# Patient Record
Sex: FEMALE | Race: AMERICAN INDIAN OR ALASKA NATIVE
[De-identification: names, ages, dates, MRNs, and addresses within clinical notes are randomized per-mention and may not be internally consistent; named-entity substitution may affect disease eponyms.]

---

## 2017-12-04 ENCOUNTER — HOSPITAL ENCOUNTER (OUTPATIENT)
Dept: HOSPITAL 5 - TRG | Age: 22
Discharge: HOME | End: 2017-12-04
Attending: OBSTETRICS & GYNECOLOGY
Payer: COMMERCIAL

## 2017-12-04 VITALS — SYSTOLIC BLOOD PRESSURE: 128 MMHG | DIASTOLIC BLOOD PRESSURE: 81 MMHG

## 2017-12-04 DIAGNOSIS — O47.03: Primary | ICD-10-CM

## 2017-12-04 DIAGNOSIS — Z3A.36: ICD-10-CM

## 2017-12-04 LAB
BACTERIA #/AREA URNS HPF: (no result) /HPF
BILIRUB UR QL STRIP: (no result)
BLOOD UR QL VISUAL: (no result)
KETONES UR STRIP-MCNC: (no result) MG/DL
LEUKOCYTE ESTERASE UR QL STRIP: (no result)
MUCOUS THREADS #/AREA URNS HPF: (no result) /HPF
NITRITE UR QL STRIP: (no result)
PH UR STRIP: 7 [PH] (ref 5–7)
PROT UR STRIP-MCNC: (no result) MG/DL
RBC #/AREA URNS HPF: 4 /HPF (ref 0–6)
UROBILINOGEN UR-MCNC: 2 MG/DL (ref ?–2)
WBC #/AREA URNS HPF: 7 /HPF (ref 0–6)

## 2017-12-04 PROCEDURE — 81001 URINALYSIS AUTO W/SCOPE: CPT

## 2017-12-04 PROCEDURE — 86850 RBC ANTIBODY SCREEN: CPT

## 2017-12-04 PROCEDURE — 86900 BLOOD TYPING SEROLOGIC ABO: CPT

## 2017-12-04 PROCEDURE — 86901 BLOOD TYPING SEROLOGIC RH(D): CPT

## 2017-12-23 ENCOUNTER — HOSPITAL ENCOUNTER (OUTPATIENT)
Dept: HOSPITAL 5 - TRG | Age: 22
Discharge: HOME | End: 2017-12-23
Attending: OBSTETRICS & GYNECOLOGY
Payer: COMMERCIAL

## 2017-12-23 VITALS — DIASTOLIC BLOOD PRESSURE: 77 MMHG | SYSTOLIC BLOOD PRESSURE: 123 MMHG

## 2017-12-23 DIAGNOSIS — O47.1: Primary | ICD-10-CM

## 2017-12-23 DIAGNOSIS — Z3A.39: ICD-10-CM

## 2017-12-23 LAB
ALT SERPL-CCNC: 11 UNITS/L (ref 7–56)
BILIRUB UR QL STRIP: (no result)
BLOOD UR QL VISUAL: (no result)
HCT VFR BLD CALC: 32.3 % (ref 30.3–42.9)
HGB BLD-MCNC: 10.3 GM/DL (ref 10.1–14.3)
KETONES UR STRIP-MCNC: (no result) MG/DL
LEUKOCYTE ESTERASE UR QL STRIP: (no result)
MCH RBC QN AUTO: 24 PG (ref 28–32)
MCHC RBC AUTO-ENTMCNC: 32 % (ref 30–34)
MCV RBC AUTO: 75 FL (ref 79–97)
MUCOUS THREADS #/AREA URNS HPF: (no result) /HPF
NITRITE UR QL STRIP: (no result)
PH UR STRIP: 7 [PH] (ref 5–7)
PLATELET # BLD: 212 K/MM3 (ref 140–440)
PROT UR STRIP-MCNC: (no result) MG/DL
RBC # BLD AUTO: 4.32 M/MM3 (ref 3.65–5.03)
RBC #/AREA URNS HPF: 3 /HPF (ref 0–6)
URATE SERPL-MCNC: 3.7 MG/DL (ref 3.5–7.6)
UROBILINOGEN UR-MCNC: 4 MG/DL (ref ?–2)
WBC # BLD AUTO: 7.6 K/MM3 (ref 4.5–11)
WBC #/AREA URNS HPF: 2 /HPF (ref 0–6)

## 2017-12-23 PROCEDURE — 85027 COMPLETE CBC AUTOMATED: CPT

## 2017-12-23 PROCEDURE — 83615 LACTATE (LD) (LDH) ENZYME: CPT

## 2017-12-23 PROCEDURE — 59025 FETAL NON-STRESS TEST: CPT

## 2017-12-23 PROCEDURE — 84460 ALANINE AMINO (ALT) (SGPT): CPT

## 2017-12-23 PROCEDURE — 81001 URINALYSIS AUTO W/SCOPE: CPT

## 2017-12-23 PROCEDURE — 82565 ASSAY OF CREATININE: CPT

## 2017-12-23 PROCEDURE — 84450 TRANSFERASE (AST) (SGOT): CPT

## 2017-12-23 PROCEDURE — 36415 COLL VENOUS BLD VENIPUNCTURE: CPT

## 2017-12-23 PROCEDURE — 84550 ASSAY OF BLOOD/URIC ACID: CPT

## 2017-12-24 ENCOUNTER — HOSPITAL ENCOUNTER (INPATIENT)
Dept: HOSPITAL 5 - TRG | Age: 22
LOS: 4 days | Discharge: HOME | End: 2017-12-28
Attending: OBSTETRICS & GYNECOLOGY | Admitting: OBSTETRICS & GYNECOLOGY
Payer: SELF-PAY

## 2017-12-24 DIAGNOSIS — F19.10: ICD-10-CM

## 2017-12-24 DIAGNOSIS — Z3A.38: ICD-10-CM

## 2017-12-24 LAB
BENZODIAZEPINES SCREEN,URINE: (no result)
BILIRUB UR QL STRIP: (no result)
BLOOD UR QL VISUAL: (no result)
HCT VFR BLD CALC: 33.6 % (ref 30.3–42.9)
HGB BLD-MCNC: 10.7 GM/DL (ref 10.1–14.3)
MCH RBC QN AUTO: 24 PG (ref 28–32)
MCHC RBC AUTO-ENTMCNC: 32 % (ref 30–34)
MCV RBC AUTO: 76 FL (ref 79–97)
METHADONE SCREEN,URINE: (no result)
MUCOUS THREADS #/AREA URNS HPF: (no result) /HPF
NITRITE UR QL STRIP: (no result)
OPIATE SCREEN,URINE: (no result)
PH UR STRIP: 7 [PH] (ref 5–7)
PLATELET # BLD: 198 K/MM3 (ref 140–440)
RBC # BLD AUTO: 4.4 M/MM3 (ref 3.65–5.03)
RBC #/AREA URNS HPF: 2 /HPF (ref 0–6)
UROBILINOGEN UR-MCNC: 4 MG/DL (ref ?–2)
WBC #/AREA URNS HPF: 1 /HPF (ref 0–6)

## 2017-12-24 PROCEDURE — 99211 OFF/OP EST MAY X REQ PHY/QHP: CPT

## 2017-12-24 PROCEDURE — 80307 DRUG TEST PRSMV CHEM ANLYZR: CPT

## 2017-12-24 PROCEDURE — 86762 RUBELLA ANTIBODY: CPT

## 2017-12-24 PROCEDURE — 84460 ALANINE AMINO (ALT) (SGPT): CPT

## 2017-12-24 PROCEDURE — 85014 HEMATOCRIT: CPT

## 2017-12-24 PROCEDURE — 83615 LACTATE (LD) (LDH) ENZYME: CPT

## 2017-12-24 PROCEDURE — 86803 HEPATITIS C AB TEST: CPT

## 2017-12-24 PROCEDURE — 85027 COMPLETE CBC AUTOMATED: CPT

## 2017-12-24 PROCEDURE — 84450 TRANSFERASE (AST) (SGOT): CPT

## 2017-12-24 PROCEDURE — 83735 ASSAY OF MAGNESIUM: CPT

## 2017-12-24 PROCEDURE — 90686 IIV4 VACC NO PRSV 0.5 ML IM: CPT

## 2017-12-24 PROCEDURE — 86706 HEP B SURFACE ANTIBODY: CPT

## 2017-12-24 PROCEDURE — 85018 HEMOGLOBIN: CPT

## 2017-12-24 PROCEDURE — 85660 RBC SICKLE CELL TEST: CPT

## 2017-12-24 PROCEDURE — 36415 COLL VENOUS BLD VENIPUNCTURE: CPT

## 2017-12-24 PROCEDURE — 86901 BLOOD TYPING SEROLOGIC RH(D): CPT

## 2017-12-24 PROCEDURE — 82565 ASSAY OF CREATININE: CPT

## 2017-12-24 PROCEDURE — 86592 SYPHILIS TEST NON-TREP QUAL: CPT

## 2017-12-24 PROCEDURE — 86850 RBC ANTIBODY SCREEN: CPT

## 2017-12-24 PROCEDURE — 86900 BLOOD TYPING SEROLOGIC ABO: CPT

## 2017-12-24 PROCEDURE — A6250 SKIN SEAL PROTECT MOISTURIZR: HCPCS

## 2017-12-24 PROCEDURE — G0463 HOSPITAL OUTPT CLINIC VISIT: HCPCS

## 2017-12-24 PROCEDURE — 81001 URINALYSIS AUTO W/SCOPE: CPT

## 2017-12-24 PROCEDURE — 87806 HIV AG W/HIV1&2 ANTB W/OPTIC: CPT

## 2017-12-24 PROCEDURE — 84550 ASSAY OF BLOOD/URIC ACID: CPT

## 2017-12-24 RX ADMIN — FENTANYL CITRATE SCH MLS/HR: 50 INJECTION, SOLUTION INTRAMUSCULAR; INTRAVENOUS at 17:36

## 2017-12-24 RX ADMIN — AMPICILLIN SODIUM SCH MLS/HR: 1 INJECTION, POWDER, FOR SOLUTION INTRAMUSCULAR; INTRAVENOUS at 18:52

## 2017-12-24 RX ADMIN — SODIUM CHLORIDE, SODIUM LACTATE, POTASSIUM CHLORIDE, AND CALCIUM CHLORIDE SCH MLS/HR: .6; .31; .03; .02 INJECTION, SOLUTION INTRAVENOUS at 14:21

## 2017-12-24 RX ADMIN — SODIUM CHLORIDE, SODIUM LACTATE, POTASSIUM CHLORIDE, AND CALCIUM CHLORIDE SCH MLS/HR: .6; .31; .03; .02 INJECTION, SOLUTION INTRAVENOUS at 12:59

## 2017-12-24 RX ADMIN — FENTANYL CITRATE SCH MLS/HR: 50 INJECTION, SOLUTION INTRAMUSCULAR; INTRAVENOUS at 16:11

## 2017-12-24 RX ADMIN — BUTORPHANOL TARTRATE PRN MG: 2 INJECTION, SOLUTION INTRAMUSCULAR; INTRAVENOUS at 13:52

## 2017-12-24 RX ADMIN — AMPICILLIN SODIUM SCH MLS/HR: 1 INJECTION, POWDER, FOR SOLUTION INTRAMUSCULAR; INTRAVENOUS at 18:00

## 2017-12-24 RX ADMIN — SODIUM CHLORIDE, SODIUM LACTATE, POTASSIUM CHLORIDE, AND CALCIUM CHLORIDE SCH MLS/HR: .6; .31; .03; .02 INJECTION, SOLUTION INTRAVENOUS at 15:38

## 2017-12-24 NOTE — HISTORY AND PHYSICAL REPORT
History of Present Illness


Date of examination: 17


Date of admission: 


17 12:25





History of present illness: 


22-year-old black female para 0 who is at estimated gestational age of 38 weeks 

by 15 week ultrasound presents to labor and delivery with complaints of regular 

contractions patient on observation had a change in the cervix in triage being 

admitted for active labor.  Patient's prenatal course is been complicated by 

lack of a prenatal care the patient has had several visits to the hospital with 

says she could not get insurance because of her lack of [place] citizenship








Past History


Past Medical History: no pertinent history


Past Surgical History: no surgical history


Social history: single





- Obstetrical History


Expected Date of Delivery: 18


Actual Gestation: 38 Week(s) 6 Day(s) 


: 1


Para: 0


Hx # Term Pregnancies: 0


Number of  Pregnancies: 0


Spontaneous Abortions: 0


Induced : 0





Medications and Allergies


 Allergies











Allergy/AdvReac Type Severity Reaction Status Date / Time


 


No Known Allergies Allergy   Verified 17 10:52











 Home Medications











 Medication  Instructions  Recorded  Confirmed  Last Taken  Type


 


Prenatal Vit Calc,Iron,Folic 1 each PO DAILY 17 09:00 

History





[Prenatal Vitamins]     











Active Meds: 


Active Medications





Butorphanol Tartrate (Stadol)  2 mg IV Q2H PRN


   PRN Reason: Pain , Severe (7-10)


   Last Admin: 17 13:52 Dose:  2 mg


Ephedrine Sulfate (Ephedrine Sulfate)  10 mg IV Q2M PRN


   PRN Reason: Hypotension


Lactated Ringer's (Lactated Ringers)  1,000 mls @ 125 mls/hr IV AS DIRECT JUAQUIN


   Last Admin: 17 14:21 Dose:  125 mls/hr


Oxytocin/Sodium Chloride (Pitocin/Ns 20 Unit/1000ml Drip)  20 units in 1,000 

mls @ 125 mls/hr IV AS DIRECT JUAQUIN


Ampicillin Sodium (Polycillin/Ns 1 Gm/50 Ml)  1 gm in 50 mls @ 100 mls/hr IV 

Q4HR JUAQUIN


   PRN Reason: Protocol


Promethazine HCl (Phenergan)  25 mg PO Q6H PRN


   PRN Reason: Nausea And Vomiting











- Vital Signs


Vital signs: 


 Vital Signs











Temp Pulse Resp BP Pulse Ox


 


 97.4 F L  78   20   129/75   98 


 


 17 07:54  17 07:54  17 07:54  17 07:54  17 07:54








 











Temp Pulse Resp BP Pulse Ox


 


 97.6 F   79   20   128/76   96 


 


 17 12:55  17 13:08  17 13:52  17 12:57  17 13:08














- Physical Exam


Breasts: Positive: deferred


Cardiovascular: Regular rate


Lungs: Positive: Normal air movement


Genitourinary (Female): Positive: normal external genitalia


Vagina: Positive: normal moisture


Cervix: Positive: other


Uterus: Positive: enlarged


Anus/Rectum: Positive: normal perianal skin





- Obstetrical


FHR: category 1


Cervical Dilatation: 5


Cervical Effacement Percentage: 80


Fetal station: -1


Uterine Contraction Pattern: Regular





Results


Result Diagrams: 


 17 13:15





 Abnormal lab results











  17 Range/Units





  13:15 


 


MCV  76 L  (79-97)  fl


 


MCH  24 L  (28-32)  pg








All other labs normal.








Assessment and Plan





- Patient Problems


(1) Insufficient prenatal care in third trimester


Current Visit: Yes   Status: Acute   


Plan to address problem: 


Patient adjustments even cares comes in triage here labor and delivery states 

that she can get insurance because is not American citizen








(2) Drug abuse during pregnancy


Current Visit: Yes   Status: Acute   


Plan to address problem: 


And taken history patient denies any drug use but has a positive urine drug 

screen.  We'll have  consult after delivery








(3) Active labor at term


Current Visit: Yes   Status: Acute   


Plan to address problem: 


Admitted labor and deliver follow labor protocol will give antibiotic 

prophylaxis

## 2017-12-24 NOTE — EVENT NOTE
Date: 12/24/17





AROM clear fluid unable to place internal monitors due patient's complaints 

about vaginal exam

## 2017-12-24 NOTE — ANESTHESIA CONSULTATION
Anesthesia Consult and Med Hx


Date of service: 12/24/17





- Airway


Anesthetic Teeth Evaluation: Good


ROM Head & Neck: Adequate


Mental/Hyoid Distance: Adequate


Intubation Access Assessment: Probably Good





- Pre-Operative Health Status


ASA Pre-Surgery Classification: ASA2, Emergency


Proposed Anesthetic Plan: Epidural, Spinal





- Pulmonary


Hx Asthma: No


COPD: No


Hx Pneumonia: No





- Cardiovascular System


Hx Hypertension: No





- Central Nervous System


Hx Seizures: No


Hx Psychiatric Problems: No





- Endocrine


Hx Renal Disease: No


Hx End Stage Renal Disease: No


Hx Hypothyroidism: No


Hx Hyperthyroidism: No





- Hematic


Hx Anemia: No


Hx Sickle Cell Disease: No





- Other Systems


Hx Alcohol Use: No

## 2017-12-25 LAB
HCT VFR BLD CALC: 31.3 % (ref 30.3–42.9)
HGB BLD-MCNC: 9.6 GM/DL (ref 10.1–14.3)

## 2017-12-25 PROCEDURE — 0KQM0ZZ REPAIR PERINEUM MUSCLE, OPEN APPROACH: ICD-10-PCS | Performed by: OBSTETRICS & GYNECOLOGY

## 2017-12-25 PROCEDURE — 10907ZC DRAINAGE OF AMNIOTIC FLUID, THERAPEUTIC FROM PRODUCTS OF CONCEPTION, VIA NATURAL OR ARTIFICIAL OPENING: ICD-10-PCS | Performed by: OBSTETRICS & GYNECOLOGY

## 2017-12-25 PROCEDURE — 3E0R3BZ INTRODUCTION OF ANESTHETIC AGENT INTO SPINAL CANAL, PERCUTANEOUS APPROACH: ICD-10-PCS | Performed by: OBSTETRICS & GYNECOLOGY

## 2017-12-25 PROCEDURE — 00HU33Z INSERTION OF INFUSION DEVICE INTO SPINAL CANAL, PERCUTANEOUS APPROACH: ICD-10-PCS | Performed by: OBSTETRICS & GYNECOLOGY

## 2017-12-25 RX ADMIN — IBUPROFEN SCH: 600 TABLET, FILM COATED ORAL at 02:25

## 2017-12-25 RX ADMIN — Medication PRN APPLIC: at 07:09

## 2017-12-25 RX ADMIN — BUTORPHANOL TARTRATE PRN MG: 2 INJECTION, SOLUTION INTRAMUSCULAR; INTRAVENOUS at 01:26

## 2017-12-25 RX ADMIN — IBUPROFEN SCH MG: 600 TABLET, FILM COATED ORAL at 06:27

## 2017-12-25 NOTE — PROCEDURE NOTE
OB Delivery Note





- Delivery


Date of Delivery: 17


Surgeon: ANTHONY DOLAN


Estimated blood loss: 300cc





- Vaginal


Intrapartum events: no prenatal care


Delivery augmentation: rupture of membranes, pitocin


Delivery monitor: external FHT, external uterine, internal FHT, internal uterine


Route of delivery: 


Delivery placenta: spontaneous


Delivery laceration: 2nd degree


Delivery repair: vicryl


Anesthesia: local, epidural





- Infant


  ** A


Apgar at 1 minute: 8


Apgar at 5 minutes: 9


Infant Gender: Female

## 2017-12-26 LAB
ALT SERPL-CCNC: 12 UNITS/L (ref 7–56)
BILIRUB UR QL STRIP: (no result)
BLOOD UR QL VISUAL: (no result)
HCT VFR BLD CALC: 30.9 % (ref 30.3–42.9)
HGB BLD-MCNC: 9.8 GM/DL (ref 10.1–14.3)
MCH RBC QN AUTO: 24 PG (ref 28–32)
MCHC RBC AUTO-ENTMCNC: 32 % (ref 30–34)
MCV RBC AUTO: 76 FL (ref 79–97)
MUCOUS THREADS #/AREA URNS HPF: (no result) /HPF
NITRITE UR QL STRIP: (no result)
PH UR STRIP: 7 [PH] (ref 5–7)
PLATELET # BLD: 213 K/MM3 (ref 140–440)
PROT UR STRIP-MCNC: (no result) MG/DL
RBC # BLD AUTO: 4.06 M/MM3 (ref 3.65–5.03)
RBC #/AREA URNS HPF: > 182 /HPF (ref 0–6)
URATE SERPL-MCNC: 4.5 MG/DL (ref 3.5–7.6)
UROBILINOGEN UR-MCNC: < 2 MG/DL (ref ?–2)
WBC #/AREA URNS HPF: 39 /HPF (ref 0–6)

## 2017-12-26 RX ADMIN — SODIUM CHLORIDE, SODIUM LACTATE, POTASSIUM CHLORIDE, AND CALCIUM CHLORIDE SCH MLS/HR: .6; .31; .03; .02 INJECTION, SOLUTION INTRAVENOUS at 14:17

## 2017-12-26 RX ADMIN — IBUPROFEN SCH: 600 TABLET, FILM COATED ORAL at 00:00

## 2017-12-26 RX ADMIN — Medication SCH: at 15:23

## 2017-12-26 RX ADMIN — IBUPROFEN SCH MG: 600 TABLET, FILM COATED ORAL at 05:30

## 2017-12-26 RX ADMIN — IBUPROFEN SCH: 600 TABLET, FILM COATED ORAL at 15:22

## 2017-12-26 RX ADMIN — LABETALOL HYDROCHLORIDE SCH MG: 200 TABLET, FILM COATED ORAL at 20:44

## 2017-12-26 RX ADMIN — IBUPROFEN SCH MG: 600 TABLET, FILM COATED ORAL at 23:52

## 2017-12-26 NOTE — EVENT NOTE
Date: 12/26/17


 elevated blood pressures noted in chart since rounds this AM, [facility] labs 

ordered. D/c order cancelled. RN informed and instructed to get cath UA sample. 

Dr. Seo consulted. Will start mag sulfate now.

## 2017-12-26 NOTE — DISCHARGE SUMMARY
Providers





- Providers


Date of Admission: 


17 12:25





Date of discharge: 17 (desires d/c home)


Attending physician: 


ANTHONY DOLAN





 





17 02:24


Consult to Case Management [CONS] Routine 


   Services Needed at Discharge: 


   Notified:: after hours











Primary care physician: 


JACQUELINE BOURNE MD








Hospitalization


Reason for admission: active labor


Delivery: 


Episiotomy: none


Laceration: 2nd degree


Incision: normal, dry, intact


Other postpartum procedures: none


Postpartum complications: none


Discharge diagnosis: IUP at term delivered


[place] baby: female


Hospital course: 


 uncomplicated vag delivery      





Condition at discharge: Good


Disposition: DC-01 TO HOME OR SELFCARE





- Discharge Diagnoses


(1) Normal vaginal delivery


Status: Acute   





(2) Drug abuse during pregnancy


Status: Acute   





(3) Insufficient prenatal care in third trimester


Status: Acute   





Plan





- Discharge Medications


Prescriptions: 


Ibuprofen [Motrin 600 MG tab] 600 mg PO Q8H PRN #30 tablet


 PRN Reason: Pain





- Provider Discharge Summary


Activity: routine, no sex for 6 weeks, no heavy lifting 4 weeks, no strenuous 

exercise


Diet: routine


Instructions: routine


Additional instructions: 


[]  Smoking cessation referral if applicable(refer to patient education folder 

for contact #)


[]  Refer to [facility] Booklet








Call your doctor immediately for:


* Fever > 100.5


* Heavy vaginal bleeding ( >1 pad per hour)


* Severe persistent headache


* Shortness of breath


* Reddened, hot, painful area to leg or breast


* Drainage or odor from incision.





* Keep incision clean and dry at all times and follow doctor's instructions 

regarding bathing/showering











- Follow up plan


Follow up: 


LIDIA ADAMS MD [Staff Physician] - 18 (Congratulations!  Please call 

[phone] to schedule your postpartum appointment in 4 weeks. Call for any 

questions or concerns.)

## 2017-12-26 NOTE — PROGRESS NOTE
Assessment and Plan





 patient doing well, no complaints. requesting d/c home today. Richard CALI 

marcos, H&H 9.6/31.3. Patient instructed on the importance of follow up in the 

office and birth control options reviewed. Will place d/c on chart per request. 





- Patient Problems


(1) Normal vaginal delivery


Current Visit: Yes   Status: Acute   





(2) Drug abuse during pregnancy


Current Visit: Yes   Status: Acute   





(3) Insufficient prenatal care in third trimester


Current Visit: Yes   Status: Acute   





Subjective





- Subjective


Date of service: 17


Principal diagnosis: postpartum day #1 s/p 


Patient reports: appetite normal, voiding normally, pain well controlled, 

ambulating normally, no dizzy ambulation, no nauseated


[place]: doing well, nursing well





Objective





- Vital Signs


Latest vital signs: 


 Vital Signs











  Temp Pulse Resp BP BP Pulse Ox


 


 17 07:54   81     99


 


 17 07:53  98.7 F  78  18  139/97   99


 


 17 01:29   74   125/83   100


 


 17 00:30  98.0 F  76  20   125/83  100


 


 17 21:30     137/86  


 


 17 17:17  97.9 F  100 H  20   145/79 


 


 17 12:30  98.6 F  90  18   128/81 


 


 17 09:08  98.9 F  85  18   131/75 








 Intake and Output











 17





 23:59 07:59 15:59


 


Intake Total 480 240 


 


Balance 480 240 


 


Intake:   


 


  Oral 480 240 


 


Other:   


 


  Total, Intake Amount 240 240 


 


  # Voids   


 


    Void 1 1 














- Exam


Breasts: Present: normal, breastfeeding


Cardiovascular: Present: Regular rate


Lungs: Present: Clear to auscultation, Normal air movement


Abdomen: Present: normal appearance, soft


Vulva: both: laceration/episiotomy


Uterus: Present: normal, firm, fundal height at umbilicus


Extremities: Present: normal


Incision: Present: normal, dry, intact





- Labs


Labs: 


 Abnormal lab results











  17 Range/Units





  13:48 


 


Hgb  9.6 L  (10.1-14.3)  gm/dl

## 2017-12-27 RX ADMIN — LABETALOL HYDROCHLORIDE SCH MG: 200 TABLET, FILM COATED ORAL at 10:06

## 2017-12-27 RX ADMIN — SODIUM CHLORIDE, SODIUM LACTATE, POTASSIUM CHLORIDE, AND CALCIUM CHLORIDE SCH MLS/HR: .6; .31; .03; .02 INJECTION, SOLUTION INTRAVENOUS at 05:25

## 2017-12-27 RX ADMIN — LABETALOL HYDROCHLORIDE SCH MG: 200 TABLET, FILM COATED ORAL at 22:34

## 2017-12-27 RX ADMIN — Medication SCH EACH: at 10:07

## 2017-12-27 RX ADMIN — IBUPROFEN SCH: 600 TABLET, FILM COATED ORAL at 17:54

## 2017-12-27 RX ADMIN — Medication PRN APPLIC: at 10:07

## 2017-12-27 RX ADMIN — IBUPROFEN SCH MG: 600 TABLET, FILM COATED ORAL at 05:21

## 2017-12-27 NOTE — PROGRESS NOTE
Assessment and Plan





- Patient Problems


(1) Pre-eclampsia in postpartum period


Onset Date: ~17   Current Visit: Yes   Status: Acute   


Plan to address problem: 


23yo s/o vaginal delivery with elevated BPs Decision to start MGSO4 X 24 hours 

Pt has now been on MagSulfate X 18 hours Last mag level 4.8 Due to be d/c @ 

1300. Pt w/o HA, blurred vision, chest pain. Pt is receiving labetalol 200mg po 

BID BP are 140-120/80s FF below umb Lochia small Perineum slight swelling 

intact Stable s/p ; PreE P: complete MGSO4 therapy, continue Labetalol Will 

review pt with . Continue POC as ordered








Subjective





- Subjective


Date of service: 17 (pt resting No c/o voiced; MGSO4 infusing)


Principal diagnosis: postpartum day #2 s/p ; PreE (MGSO4 X 24hrs)


Patient reports: appetite normal, voiding normally (yip clear yellow urine), 

pain well controlled


[place]: doing well





Objective





- Vital Signs


Latest vital signs: 


 Vital Signs











  Temp Pulse Resp BP BP Pulse Ox


 


 17 02:10    20   128/83  100


 


 17 00:05  98.2 F  85  20   145/87  100


 


 17 21:45      141/93 


 


 17 20:10  97.3 F L  91 H  20   158/104  100


 


 17 18:48   90  22   150/100 


 


 17 16:00  98.2 F  91 H  18   142/88  100


 


 17 14:40    18   109/80 


 


 17 14:35   66  18   118/75 


 


 17 14:30   81  18   122/77 


 


 17 14:25   82  18   154/92  100


 


 17 13:30  98.5 F  83  18   166/114  99


 


 17 12:00  98.5 F  87  20   154/102  98


 


 17 07:54   81     99


 


 17 07:53  98.7 F  78  18  139/97   99








 Intake and Output











 17





 14:59 22:59 06:59


 


Intake Total 


 


Output Total  2550 1200


 


Balance 340 -1805 100


 


Intake:   


 


  IV   1000


 


    Lactated Ringers 1,000 ml   1000





    @ 125 mls/hr IV AS   





    DIRECT [facility] Rx#:[id number]   


 


  Oral 340 620 300


 


  Other  125 


 


Output:   


 


  Urine  2550 1200


 


    Void  2550 1200


 


Other:   


 


  Intake, Other Source  Saline Solution 


 


  Total, Intake Amount 340 100 200


 


  Total, Output Amount  500 700


 


  # Voids   


 


    Void  1 1














- Exam


Breasts: Present: normal, breastfeeding


Cardiovascular: Present: Regular rate


Lungs: Present: Normal air movement


Abdomen: Present: normal appearance, soft


Vulva: both: normal


Uterus: Present: normal


Extremities: Present: normal, edema


Deep Tendon Reflex Grade: Normal +2


Incision: Present: normal, dry, intact





- Labs


Labs: 


 Abnormal lab results











  17 Range/Units





  13:47 13:47 17:35 


 


WBC  11.2 H    (4.5-11.0)  K/mm3


 


Hgb  9.8 L    (10.1-14.3)  gm/dl


 


MCV  76 L    (79-97)  fl


 


MCH  24 L    (28-32)  pg


 


Creatinine   0.5 L   (0.7-1.2)  mg/dL


 


Magnesium     (1.7-2.3)  mg/dL


 


Lactate Dehydrogenase   287 H   ()  units/L


 


Urine WBC (Auto)    39.0 H  (0.0-6.0)  /HPF














  17 Range/Units





  19:55 00:48 


 


WBC    (4.5-11.0)  K/mm3


 


Hgb    (10.1-14.3)  gm/dl


 


MCV    (79-97)  fl


 


MCH    (28-32)  pg


 


Creatinine    (0.7-1.2)  mg/dL


 


Magnesium  4.00 H  4.50 H  (1.7-2.3)  mg/dL


 


Lactate Dehydrogenase    ()  units/L


 


Urine WBC (Auto)    (0.0-6.0)  /HPF

## 2017-12-28 VITALS — DIASTOLIC BLOOD PRESSURE: 82 MMHG | SYSTOLIC BLOOD PRESSURE: 144 MMHG

## 2017-12-28 RX ADMIN — Medication SCH EACH: at 09:11

## 2017-12-28 RX ADMIN — IBUPROFEN SCH: 600 TABLET, FILM COATED ORAL at 00:00

## 2017-12-28 RX ADMIN — Medication PRN APPLIC: at 02:46

## 2017-12-28 RX ADMIN — LABETALOL HYDROCHLORIDE SCH MG: 200 TABLET, FILM COATED ORAL at 09:11

## 2017-12-28 NOTE — DISCHARGE SUMMARY
Providers





- Providers


Date of Admission: 


17 12:25





Date of discharge: 17 (pt desires d/c)


Attending physician: 


ANTHONY DOLAN





 





17 02:24


Consult to Case Management [CONS] Routine 


   Services Needed at Discharge: 


   Notified:: after hours











Primary care physician: 


JACQUELINE BOURNE MD








Hospitalization


Reason for admission: active labor


Delivery: 


Episiotomy: none


Laceration: 1st degree


Incision: normal


Postpartum complications: other (PreE postpartum)


Discharge diagnosis: IUP at term delivered


 baby: female


Hospital course: 


uncomplicated vaginal delivery


postpartum PreE: MGSO4 X 24 hours





Pt w/o complaint VSS FF below umb Lochia scant Perineum intact Doing well s/p 

vag del; PreE P: d/c today with instructions RTO 1 week for BP check Take 

medications as prescribed Call with HA, blurred vision, chest pain.





Condition at discharge: Good


Disposition: DC-01 TO HOME OR SELFCARE





- Discharge Diagnoses


(1) Pre-eclampsia in postpartum period


Status: Acute   Comment: RTO 1 week BP check   





Plan





- Discharge Medications


Prescriptions: 


Ibuprofen [Motrin 600 MG tab] 600 mg PO Q8H PRN #30 tablet


 PRN Reason: Pain


Labetalol [Normodyne TAB] 200 mg PO BID #60 tablet





- Provider Discharge Summary


Activity: routine, no sex for 6 weeks, no heavy lifting 4 weeks, no strenuous 

exercise


Diet: other (no salt)


Instructions: routine


Additional instructions: 


[]  Smoking cessation referral if applicable(refer to patient education folder 

for contact #)


[]  Refer to [facility] Booklet








Call your doctor immediately for:


* Fever > 100.5


* Heavy vaginal bleeding ( >1 pad per hour)


* Severe persistent headache


* Shortness of breath


* Reddened, hot, painful area to leg or breast


* Drainage or odor from incision.





* Keep incision clean and dry at all times and follow doctor's instructions 

regarding bathing/showering











- Follow up plan


Follow up: 


LIDIA ADAMS MD [Staff Physician] - 7 Days


(Congratulations!  Please call [phone] to schedule your blood pressure 

check in one week.  Please keep your postpartum appointment as scheduled on  in the [facility] office [address] [place] [address], behind [employer].


Take medications as prescribed. Call with headache unrelieved with Tylenol, 

blurred vision, chest pain.)


Forms:  [facility] Discharge Summary

## 2021-01-22 ENCOUNTER — HOSPITAL ENCOUNTER (EMERGENCY)
Dept: HOSPITAL 5 - ED | Age: 26
Discharge: HOME | End: 2021-01-22
Payer: SELF-PAY

## 2021-01-22 VITALS — SYSTOLIC BLOOD PRESSURE: 128 MMHG | DIASTOLIC BLOOD PRESSURE: 78 MMHG
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
## 2021-01-22 DIAGNOSIS — F12.10: ICD-10-CM
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
## 2021-01-22 DIAGNOSIS — S16.1XXA: Primary | ICD-10-CM

## 2021-01-22 DIAGNOSIS — Z79.899: ICD-10-CM

## 2021-01-22 PROCEDURE — 99283 EMERGENCY DEPT VISIT LOW MDM: CPT

## 2021-01-22 PROCEDURE — 72125 CT NECK SPINE W/O DYE: CPT

## 2021-01-22 NOTE — CAT SCAN REPORT
CT CERVICAL SPINE WITHOUT CONTRAST



INDICATION:  Neck injury, left shoulder pain, cervical TTP. 



TECHNIQUE:  Axial imaging performed through the cervical spine without the use of contrast.  Sagittal
 and coronal reconstructed images were also reviewed.  All CT scans at this location are performed us
ing CT dose reduction for ALARA by means of automated exposure control. 



COMPARISON:  None



FINDINGS: 



Alignment:  Spinal alignment is normal. There is mild reversal of the normal cervical lordosis which 
is probably positional in nature. Muscular spasm could be considered.

Bones:  There is no acute osseous abnormality.  No significant degenerative changes.

Soft tissues:  No acute or significant incidental soft tissue abnormality.



IMPRESSION:  No acute abnormality.



Signer Name: Frank Webb Jr, MD 

Signed: 1/22/2021 8:35 AM

Workstation Name: [id number]

## 2021-01-22 NOTE — EMERGENCY DEPARTMENT REPORT
ED Neck Pain/Injury HPI





- General


Chief Complaint: Neck Pain/Injury


Stated Complaint: NECK INJURY/PAIN


Mode of arrival: Ambulatory


Limitations: No Limitations





- History of Present Illness


Initial Comments: 





The patient was evaluated in the emergency department for symptoms described in 

the history of present illness.  He/she was evaluated in the context of the 

global COVID-19 pandemic, which necessitated consideration that the patient 

might be at risk for infection with the virus that causes COVID-19.  

Institutional protocols and algorithms that pertain to the evaluation of 

patients at risk for COVID-19 are in a state of rapid change based on 

information released by regulatory bodies including the CDC and federal and 

state organizations.  These policies and algorithms were followed during the stella connor's care in the emergency department.  Please note that these policies, 

procedures and recommendations changed on a rapid basis.








25-year-old -American female presents to the emergency room for neck pain

x1 week.  Patient states that she was jumping on the trampoline trying to do a 

flip with her kids and had injured her neck.  Patient states turning her neck to

the right makes the pain worse.  Patient states is been taken Tylenol without 

much relief.  She does admit to nausea none and some blurred vision.  She denies

any vomiting.  She last took Excedrin 2 hours prior to arrival.  She reports she

has a headache and some numbness to her left arm is intermittent.  Last 

menstrual period was 1/3/2021.  She is  1 para 1.  Primary care providers

none.  She currently takes no medications on a daily basis and has no known drug

allergies.


MD Complaint: neck pain, neck injury


Onset/Timin


-: week(s)


Place: street/outdoors


Radiation: left upper extremity


Severity scale (0 -10): 8


Quality: tingling, other (throbbimg)


Consistency: constant


Improves With: cold therapy, heat therapy


Worsens With: movement of neck


Context: fall


Associated Symptoms: headache, nausea, other (blurred vision)





- Related Data


                                Home Medications











 Medication  Instructions  Recorded  Confirmed  Last Taken


 


Prenatal Vit Calc,Iron,Folic 1 each PO DAILY 17 09:00





[Prenatal Vitamins]    








                                  Previous Rx's











 Medication  Instructions  Recorded  Last Taken  Type


 


labetaloL [Labetalol 200mg TAB] 200 mg PO BID #60 tablet 17 Unknown Rx


 


Ibuprofen [Motrin 800 MG tab] 800 mg PO TID PRN #30 tablet 17 Unknown Rx


 


Ibuprofen [Motrin 600 MG tab] 600 mg PO Q8H PRN #30 tablet 21 Unknown Rx


 


Tizanidine HCl [Zanaflex 2mg CAP] 2 mg PO Q8H PRN #12 capsule 21 Unknown 

Rx











                                    Allergies











Allergy/AdvReac Type Severity Reaction Status Date / Time


 


No Known Allergies Allergy   Verified 17 10:52














ED Review of Systems


ROS: 


Stated complaint: NECK INJURY/PAIN


Other details as noted in HPI





Comment: All other systems reviewed and negative





ED Past Medical Hx





- Past Medical History


Previous Medical History?: No


Hx Hypertension: No


Hx Congestive Heart Failure: No


Hx Diabetes: No


Hx Deep Vein Thrombosis: No


Hx Renal Disease: No


Hx Sickle Cell Disease: No


Hx Seizures: No


Hx Asthma: No


Hx COPD: No


Hx HIV: No





- Surgical History


Past Surgical History?: No





- Social History


Smoking Status: Never Smoker


Substance Use Type: Marijuana





- Medications


Home Medications: 


                                Home Medications











 Medication  Instructions  Recorded  Confirmed  Last Taken  Type


 


Prenatal Vit Calc,Iron,Folic 1 each PO DAILY 17 09:00 

History





[Prenatal Vitamins]     


 


labetaloL [Labetalol 200mg TAB] 200 mg PO BID #60 tablet 17  Unknown Rx


 


Ibuprofen [Motrin 800 MG tab] 800 mg PO TID PRN #30 tablet 17  Unknown Rx


 


Ibuprofen [Motrin 600 MG tab] 600 mg PO Q8H PRN #30 tablet 21  Unknown Rx


 


Tizanidine HCl [Zanaflex 2mg CAP] 2 mg PO Q8H PRN #12 capsule 21  Unknown 

Rx














ED Physical Exam





- General


Limitations: No Limitations





- Head


Head exam: Present: atraumatic, normocephalic





- Eye


Eye exam: Present: normal appearance, PERRL





- ENT


ENT exam: Present: mucous membranes moist





- Neck


Neck exam: Present: tenderness (Cervical)





- Respiratory


Respiratory exam: Present: accessory muscle use





- Cardiovascular


Cardiovascular Exam: Present: regular rate, normal rhythm.  Absent: systolic 

murmur, diastolic murmur, rubs, gallop





- Back Exam


Back exam: Present: muscle spasm





- Neurological Exam


Neurological exam: Present: alert, oriented X3





- Psychiatric


Psychiatric exam: Present: normal affect, normal mood





- Skin


Skin exam: Present: warm, dry, intact, normal color.  Absent: rash





ED Course


                                   Vital Signs











  21





  07:07


 


Temperature 98.1 F


 


Pulse Rate 68


 


Respiratory 18





Rate 


 


Blood Pressure 128/78


 


O2 Sat by Pulse 100





Oximetry 














ED Medical Decision Making





- Radiology Data


Radiology results: report reviewed





[facility] 


[address] [place] [address] 





Cat Scan Report 


Signed 





 Patient: ANALILIA WHITMAN MR#: [id number] 


 [id number] 


 : 1995 Acct:[id number] 





 Age/Sex: 25 / F ADM Date: 21 





 Loc: ED 


 Attending Dr: 








 Ordering Physician: STELLA RIOS 


 Date of Service: 21 


 Procedure(s): CT cervical spine wo con 


 Accession Number(s): [id number] 





 cc: STELLA RIOS 








 CT CERVICAL SPINE WITHOUT CONTRAST 





INDICATION: Neck injury, left shoulder pain, cervical TTP. 





TECHNIQUE: Axial imaging performed through the cervical spine without the use of

contrast. 


 Sagittal and coronal reconstructed images were also reviewed. All CT scans at 

this location are 


 performed using CT dose reduction for ALARA by means of automated exposure 

control. 





COMPARISON: None 





FINDINGS: 





Alignment: Spinal alignment is normal. There is mild reversal of the normal 

cervical lordosis 


 which is probably positional in nature. Muscular spasm could be considered. 


Bones: There is no acute osseous abnormality. No significant degenerative 

changes. 


Soft tissues: No acute or significant incidental soft tissue abnormality. 





IMPRESSION: No acute abnormality. 





Signer Name: Frank Webb Jr, MD 


Signed: 2021 8:35 AM 


Workstation Name: [id number] 








 Transcribed By: TTR 


 Dictated By: FRANK WEBB JR, MD 


 Electronically Authenticated By: FRANK WEBB JR, MD 


 Signed Date/Time: 2135 











 DD/DT: 21 





- Medical Decision Making





25-year-old -American female presents to the emergency room for neck pain

x1 week.  Patient states that she was jumping on the trampoline trying to do a 

flip with her kids and had injured her neck.  Patient states turning her neck to

the right makes the pain worse.  Patient states is been taken Tylenol without 

much relief.  She does admit to nausea none and some blurred vision.  She denies

any vomiting.  She last took Excedrin 2 hours prior to arrival.  She reports she

has a headache and some numbness to her left arm is intermittent.  Last 

menstrual period was 1/3/2021.  She is  1 para 1.  Primary care providers

none.  She currently takes no medications on a daily basis and has no known drug

allergies.








CT neck has been ordered.





CT scan is negative for any acute abnormalities.  I recommend ibuprofen and 

baclofen warm compresses and to follow-up with a neurologist if symptoms 

persist.


Critical care attestation.: 


If time is entered above; I have spent that time in minutes in the direct care 

of this critically ill patient, excluding procedure time.








ED Disposition


Clinical Impression: 


 Acute cervical myofascial strain





Disposition: - TO HOME OR SELFCARE


Is pt being admited?: No


Does the pt Need Aspirin: No


Condition: Stable


Instructions:  Cervical Strain and Sprain Rehab-[facility]


Additional Instructions: 


CT is negative for any acute abnormalities.  I recommend ibuprofen and muscle 

relaxant.  I am referring her to a neurologist so he can follow-up.


Prescriptions: 


Ibuprofen [Motrin 600 MG tab] 600 mg PO Q8H PRN #30 tablet


 PRN Reason: Pain


Tizanidine HCl [Zanaflex 2mg CAP] 2 mg PO Q8H PRN #12 capsule


 PRN Reason: Muscle Spasm


Referrals: 


PRIMARY CARE,MD [Primary Care Provider] - 3-5 Days


FELIBERTO LOVELL II, MD [Staff Physician] - 3-5 Days


Forms:  Work/School Release Form(ED)

## 2021-01-23 ENCOUNTER — HOSPITAL ENCOUNTER (EMERGENCY)
Dept: HOSPITAL 5 - ED | Age: 26
Discharge: HOME | End: 2021-01-23
Payer: SELF-PAY

## 2021-01-23 VITALS — SYSTOLIC BLOOD PRESSURE: 123 MMHG | DIASTOLIC BLOOD PRESSURE: 83 MMHG

## 2021-01-23 DIAGNOSIS — Z79.899: ICD-10-CM
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
## 2021-01-23 DIAGNOSIS — S09.90XA: Primary | ICD-10-CM
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
## 2021-01-23 PROCEDURE — 70450 CT HEAD/BRAIN W/O DYE: CPT

## 2021-01-23 NOTE — CAT SCAN REPORT
CT HEAD WITHOUT CONTRAST



INDICATION / CLINICAL INFORMATION: HA after flipping on trampoline a week ago.



TECHNIQUE: All CT scans at this location are performed using CT dose reduction for ALARA by means of 
automated exposure control. 



COMPARISON: None available.



FINDINGS:

HEMORRHAGE: None.

EXTRA-AXIAL SPACES: Normal in size and morphology for the patient's age.

VENTRICULAR SYSTEM: Normal in size and morphology for the patient's age.

CEREBRAL PARENCHYMA: No significant abnormality. No acute territorial infarct. 

MIDLINE SHIFT / HERNIATION: None.

CEREBELLUM / BRAINSTEM: No significant abnormality.



ORBITS: Normal as visualized.

SOFT TISSUES: No significant abnormality.

SKULL: No significant abnormality.

PARANASAL SINUSES / MASTOID AIR CELLS: Normal as visualized.



ADDITIONAL FINDINGS: None.



IMPRESSION:

1. No acute intracranial abnormality.



Signer Name: Trav Ching MD 

Signed: 1/23/2021 3:37 AM

Workstation Name: [id number]

## 2021-01-23 NOTE — EMERGENCY DEPARTMENT REPORT
ED Head Trauma HPI





- General


Chief complaint: Headache


Stated complaint: NECK PAIN


Time Seen by Provider: 01/23/21 02:51


Source: patient


Mode of arrival: Ambulatory


Limitations: No Limitations





- History of Present Illness


Initial comments: 





Patient is a 25-year-old female presents emergency room complaints of a head 

injury that occurred a week ago.  Patient states that she was jumping on the 

trampoline landed incorrectly.  Patient was evaluated in the emergency 

department yesterday due to neck pain and had a CT scan of the cervical spine 

which showed no acute findings. she was given a prescription for ibuprofen and 

tizanidine.  She states that she has only taken 1 dose of the medication.  She 

states that her neck pain is improving but she continues to have a persistent 

headache.  She states that she had one episode of vomiting secondary to the 

headache.  She denies any vision changes, numbness, weakness, bowel or bladder 

incontinence, any other injury.  no Medical history.  No allergies medications. 

Last menstrual cycle 1/3/2021.





- Related Data


                                Home Medications











 Medication  Instructions  Recorded  Confirmed  Last Taken


 


Prenatal Vit Calc,Iron,Folic 1 each PO DAILY 12/23/17 12/24/17 12/17/17 09:00





[Prenatal Vitamins]    








                                  Previous Rx's











 Medication  Instructions  Recorded  Last Taken  Type


 


labetaloL [Labetalol 200mg TAB] 200 mg PO BID #60 tablet 12/27/17 Unknown Rx


 


Ibuprofen [Motrin 800 MG tab] 800 mg PO TID PRN #30 tablet 12/28/17 Unknown Rx


 


Ibuprofen [Motrin 600 MG tab] 600 mg PO Q8H PRN #30 tablet 01/22/21 Unknown Rx


 


Tizanidine HCl [Zanaflex 2mg CAP] 2 mg PO Q8H PRN #12 capsule 01/22/21 Unknown 

Rx


 


Butalb/Acetaminophen/Caffeine 1 cap PO Q8HR PRN #10 cap 01/23/21 Unknown Rx





[Fioricet -40 mg CAP]    


 


Ondansetron [Zofran Odt] 4 mg PO Q8HR PRN #10 tab.rapdis 01/23/21 Unknown Rx











Allergies/Adverse reactions: 


                                    Allergies











Allergy/AdvReac Type Severity Reaction Status Date / Time


 


No Known Allergies Allergy   Verified 12/23/17 10:52














ED Review of Systems


ROS: 


Stated complaint: NECK PAIN


Other details as noted in HPI





Comment: All other systems reviewed and negative





ED Past Medical Hx





- Past Medical History


Previous Medical History?: No


Hx Hypertension: No


Hx Congestive Heart Failure: No


Hx Diabetes: No


Hx Deep Vein Thrombosis: No


Hx Renal Disease: No


Hx Sickle Cell Disease: No


Hx Seizures: No


Hx Asthma: No


Hx COPD: No


Hx HIV: No





- Surgical History


Past Surgical History?: No





- Social History


Smoking Status: Never Smoker


Substance Use Type: None





- Medications


Home Medications: 


                                Home Medications











 Medication  Instructions  Recorded  Confirmed  Last Taken  Type


 


Prenatal Vit Calc,Iron,Folic 1 each PO DAILY 12/23/17 12/24/17 12/17/17 09:00 

History





[Prenatal Vitamins]     


 


labetaloL [Labetalol 200mg TAB] 200 mg PO BID #60 tablet 12/27/17  Unknown Rx


 


Ibuprofen [Motrin 800 MG tab] 800 mg PO TID PRN #30 tablet 12/28/17  Unknown Rx


 


Ibuprofen [Motrin 600 MG tab] 600 mg PO Q8H PRN #30 tablet 01/22/21  Unknown Rx


 


Tizanidine HCl [Zanaflex 2mg CAP] 2 mg PO Q8H PRN #12 capsule 01/22/21  Unknown 

Rx


 


Butalb/Acetaminophen/Caffeine 1 cap PO Q8HR PRN #10 cap 01/23/21  Unknown Rx





[Fioricet -40 mg CAP]     


 


Ondansetron [Zofran Odt] 4 mg PO Q8HR PRN #10 tab.rapdis 01/23/21  Unknown Rx














ED Physical Exam





- General


Limitations: No Limitations


General appearance: alert, in no apparent distress





- Head


Head exam: Present: atraumatic, normocephalic





- Eye


Eye exam: Present: normal appearance, PERRL, EOMI.  Absent: periorbital 

swelling, periorbital tenderness





- ENT


ENT exam: Present: mucous membranes moist





- Neck


Neck exam: Present: normal inspection, tenderness (bilateral c-spine paraspinal 

muscular ttp), full ROM





- Respiratory


Respiratory exam: Present: normal lung sounds bilaterally.  Absent: respiratory 

distress, wheezes, rales, rhonchi, stridor, chest wall tenderness, accessory 

muscle use, decreased breath sounds, prolonged expiratory





- Cardiovascular


Cardiovascular Exam: Present: regular rate, normal heart sounds.  Absent: 

systolic murmur, diastolic murmur, rubs, gallop





- Neurological Exam


Neurological exam: Present: alert, oriented X3, CN II-XII intact, normal gait.  

Absent: motor sensory deficit





- Psychiatric


Psychiatric exam: Present: normal affect, normal mood





- Skin


Skin exam: Present: warm, dry, intact





ED Course


                                   Vital Signs











  01/23/21





  02:56


 


Temperature 97.7 F


 


Pulse Rate 65


 


Respiratory 18





Rate 


 


Blood Pressure 123/83





[Left] 


 


O2 Sat by Pulse 100





Oximetry 














- Radiology Data


Radiology results: report reviewed


Ordering Physician: MINDA JAY 


 Date of Service: 01/23/21 


 Procedure(s): CT head/brain wo con 


 Accession Number(s): [id number] 





 cc: MINDA JAY 








 CT HEAD WITHOUT CONTRAST 





INDICATION / CLINICAL INFORMATION: HA after flipping on trampoline a week ago. 





TECHNIQUE: All CT scans at this location are performed using CT dose reduction 

for ALARA by means 


 of automated exposure control. 





COMPARISON: None available. 





FINDINGS: 


HEMORRHAGE: None. 


EXTRA-AXIAL SPACES: Normal in size and morphology for the patient's age. 


VENTRICULAR SYSTEM: Normal in size and morphology for the patient's age. 


CEREBRAL PARENCHYMA: No significant abnormality. No acute territorial infarct. 


MIDLINE SHIFT / HERNIATION: None. 


CEREBELLUM / BRAINSTEM: No significant abnormality. 





ORBITS: Normal as visualized. 


SOFT TISSUES: No significant abnormality. 


SKULL: No significant abnormality. 


PARANASAL SINUSES / MASTOID AIR CELLS: Normal as visualized. 





ADDITIONAL FINDINGS: None. 





IMPRESSION: 


1. No acute intracranial abnormality. 





Signer Name: Trav Ching MD 


Signed: 1/23/2021 3:37 AM 


Workstation Name: [id number] 








 Transcribed By: SS 


 Dictated By: Trav Ching MD 


 Electronically Authenticated By: Trav Ching MD 


 Signed Date/Time: 01/23/21 0337 











 DD/DT: 01/23/21 0335 


 TD/TT: 





- Medical Decision Making





Patient is a 25-year-old female presents emergency room complaints of a head 

injury that occurred a week ago.  Patient states that she was jumping on the 

trampoline landed incorrectly.  Patient was evaluated in the emergency 

department yesterday due to neck pain and had a CT scan of the cervical spine 

which showed no acute findings. she was given a prescription for ibuprofen and 

tizanidine.  She states that she has only taken 1 dose of the medication.  She 

states that her neck pain is improving but she continues to have a persistent 

headache.  She states that she had one episode of vomiting secondary to the 

headache.  She denies any vision changes, numbness, weakness, bowel or bladder 

incontinence, any other injury.  no Medical history.  No allergies medications. 

 Last menstrual cycle 1/3/2021.  Vitals are normal.  No neuro deficits on exam. 

 CT head 1. No acute intracranial abnormality.  Discussed all results with 

patient and answered questions.  Symptoms likely related to minor head injury 

versus concussion.  Patient given prescription for Fioricet and Zofran.  

Discussed the importance of neurology follow-up and primary care follow-up.  

Discussed strict return precautions.  Advised patient Please take medication as 

prescribed.  Follow-up with your primary care doctor.  Follow-up with the 

neurologist.  Return to emergency room for any new or worsening symptoms.


Critical care attestation.: 


If time is entered above; I have spent that time in minutes in the direct care 

of this critically ill patient, excluding procedure time.








ED Disposition


Clinical Impression: 


Minor head injury


Qualifiers:


 Encounter type: initial encounter Qualified Code(s): S09.90XA - Unspecified 

injury of head, initial encounter





Disposition: DC-01 TO HOME OR SELFCARE


Is pt being admited?: No


Does the pt Need Aspirin: No


Condition: Stable


Instructions:  Head Injury, Adult, Easy-to-Read


Additional Instructions: 


Please take medication as prescribed.  Follow-up with your primary care doctor. 

 Follow-up with the neurologist.  Return to emergency room for any new or 

worsening symptoms.








The CT scan of your head is normal


Prescriptions: 


Butalb/Acetaminophen/Caffeine [Fioricet -40 mg CAP] 1 cap PO Q8HR PRN #10 

cap


 PRN Reason: headache


Ondansetron [Zofran Odt] 4 mg PO Q8HR PRN #10 tab.rapdis


 PRN Reason: Nausea And Vomiting


Referrals: 


[facility] CARE,MD [Primary Care Provider] - 2-3 Days


OLIVE RODAS MD [Referring] - 2-3 Days


Time of Disposition: 03:45


Print Language: ENGLISH

## 2021-01-25 ENCOUNTER — HOSPITAL ENCOUNTER (EMERGENCY)
Dept: HOSPITAL 5 - ED | Age: 26
Discharge: HOME | End: 2021-01-25
Payer: SELF-PAY

## 2021-01-25 VITALS — DIASTOLIC BLOOD PRESSURE: 89 MMHG | SYSTOLIC BLOOD PRESSURE: 138 MMHG

## 2021-01-25 DIAGNOSIS — M54.2: Primary | ICD-10-CM

## 2021-01-25 DIAGNOSIS — F17.200: ICD-10-CM

## 2021-01-25 DIAGNOSIS — F12.10: ICD-10-CM

## 2021-01-25 DIAGNOSIS — R51.9: ICD-10-CM

## 2021-01-25 DIAGNOSIS — Z79.899: ICD-10-CM

## 2021-01-25 PROCEDURE — 99283 EMERGENCY DEPT VISIT LOW MDM: CPT

## 2021-01-25 NOTE — EMERGENCY DEPARTMENT REPORT
ED General Adult HPI





- General


Chief complaint: Dizziness


Stated complaint: DIZZINESS/HEAD PAIN


Time Seen by Provider: 01/25/21 03:49


Source: patient


Mode of arrival: Ambulatory


Limitations: No Limitations





- History of Present Illness


Initial comments: 





25-year-old female over 1 week status post trampoline accident resulting in neck

pain and headaches for which she has been seen in emergency department x2.  She 

is she is undergone a CT scan of the head and neck showing no acute pathology 

and has been prescribed muscle relaxers and anti-inflammatories.  Anti-

inflammatories caused her to have a lot of nausea and vomiting which began to 

cause her neck pain to worsen and having pain to the base of the skull she was 

seen again in the emergency department as a follow-up for the worsening pain and

was prescribed with Zofran and Fioricet which she states does help her pain when

she takes them here but she is not yet picked up her prescription and therefore 

her nausea and pain has flared up again and she seeks treatment while here in 

the emergency department.  She reports no new injury no worsening symptoms but 

symptoms do continue to linger because of compliance related issues


Radiation: neck


Severity scale (0 -10): 10


Quality: aching


Consistency: constant


Improves with: none


Worsens with: movement


Associated Symptoms: denies: confusion, cough, diaphoresis, malaise, 

nausea/vomiting, shortness of breath, syncope





- Related Data


                                Home Medications











 Medication  Instructions  Recorded  Confirmed  Last Taken


 


Prenatal Vit Calc,Iron,Folic 1 each PO DAILY 12/23/17 12/24/17 12/17/17 09:00





[Prenatal Vitamins]    








                                  Previous Rx's











 Medication  Instructions  Recorded  Last Taken  Type


 


labetaloL [Labetalol 200mg TAB] 200 mg PO BID #60 tablet 12/27/17 Unknown Rx


 


Ibuprofen [Motrin 800 MG tab] 800 mg PO TID PRN #30 tablet 12/28/17 Unknown Rx


 


Ibuprofen [Motrin 600 MG tab] 600 mg PO Q8H PRN #30 tablet 01/22/21 Unknown Rx


 


Tizanidine HCl [Zanaflex 2mg CAP] 2 mg PO Q8H PRN #12 capsule 01/22/21 Unknown 

Rx


 


Butalb/Acetaminophen/Caffeine 1 cap PO Q8HR PRN #10 cap 01/23/21 Unknown Rx





[Fioricet -40 mg CAP]    


 


Ondansetron [Zofran Odt] 4 mg PO Q8HR PRN #10 tab.rapdis 01/23/21 Unknown Rx











                                    Allergies











Allergy/AdvReac Type Severity Reaction Status Date / Time


 


No Known Allergies Allergy   Verified 12/23/17 10:52














ED Review of Systems


ROS: 


Stated complaint: DIZZINESS/HEAD PAIN


Other details as noted in HPI





Comment: All other systems reviewed and negative





ED Past Medical Hx





- Past Medical History


Previous Medical History?: No


Hx Hypertension: No


Hx Congestive Heart Failure: No


Hx Diabetes: No


Hx Deep Vein Thrombosis: No


Hx Renal Disease: No


Hx Sickle Cell Disease: No


Hx Seizures: No


Hx Asthma: No


Hx COPD: No


Hx HIV: No





- Surgical History


Past Surgical History?: No





- Social History


Smoking Status: Current Every Day Smoker


Substance Use Type: Marijuana





- Medications


Home Medications: 


                                Home Medications











 Medication  Instructions  Recorded  Confirmed  Last Taken  Type


 


Prenatal Vit Calc,Iron,Folic 1 each PO DAILY 12/23/17 12/24/17 12/17/17 09:00 

History





[Prenatal Vitamins]     


 


labetaloL [Labetalol 200mg TAB] 200 mg PO BID #60 tablet 12/27/17  Unknown Rx


 


Ibuprofen [Motrin 800 MG tab] 800 mg PO TID PRN #30 tablet 12/28/17  Unknown Rx


 


Ibuprofen [Motrin 600 MG tab] 600 mg PO Q8H PRN #30 tablet 01/22/21  Unknown Rx


 


Tizanidine HCl [Zanaflex 2mg CAP] 2 mg PO Q8H PRN #12 capsule 01/22/21  Unknown 

Rx


 


Butalb/Acetaminophen/Caffeine 1 cap PO Q8HR PRN #10 cap 01/23/21  Unknown Rx





[Fioricet -40 mg CAP]     


 


Ondansetron [Zofran Odt] 4 mg PO Q8HR PRN #10 tab.rapdis 01/23/21  Unknown Rx














ED Physical Exam





- General


Limitations: No Limitations


General appearance: alert, in no apparent distress





- Head


Head exam: Present: atraumatic, normocephalic





- Eye


Eye exam: Present: normal appearance, PERRL, EOMI


Pupils: Present: normal accommodation





- ENT


ENT exam: Present: mucous membranes moist





- Neck


Neck exam: Present: normal inspection, tenderness, other (Tenderness to the neck

with palpation.  Small spasm is noted to the left trapezial region.  No bruising

no ecchymosis no crepitus).  Absent: lymphadenopathy, thyromegaly





- Respiratory


Respiratory exam: Present: normal lung sounds bilaterally.  Absent: respiratory 

distress





- Cardiovascular


Cardiovascular Exam: Present: regular rate, normal rhythm.  Absent: systolic 

murmur, diastolic murmur, rubs, gallop





- GI/Abdominal


GI/Abdominal exam: Present: soft, normal bowel sounds





- Extremities Exam


Extremities exam: Present: normal inspection, normal capillary refill





- Back Exam


Back exam: Present: normal inspection





- Neurological Exam


Neurological exam: Present: alert, oriented X3, CN II-XII intact, normal gait, 

other (Gait coordinated and smooth.  Romberg is negative.)





- Psychiatric


Psychiatric exam: Present: normal affect, normal mood.  Absent: anxious, flat 

affect





- Skin


Skin exam: Present: warm, dry, intact, normal color.  Absent: rash





ED Course


                                   Vital Signs











  01/25/21





  03:43


 


Temperature 97.3 F L


 


Pulse Rate 101 H


 


Respiratory 16





Rate 


 


Blood Pressure 138/89


 


O2 Sat by Pulse 97





Oximetry 














ED Medical Decision Making





- Medical Decision Making





25-year-old female with chronic neck pain continue treated with Norco and Zofran

for the nausea seeing tolerating medication no complications resting in chair in

awkward position appears to be sleeping no acute distress which is a sign of 

significant progress from her initial presentation for this particular visit.  

Advised of the importance of compliance and follow-up with orthopedics and her 

primary care provider for definitive treatment.


Critical care attestation.: 


If time is entered above; I have spent that time in minutes in the direct care 

of this critically ill patient, excluding procedure time.








ED Disposition


Clinical Impression: 


 Neck pain, Cephalgia





Disposition: DC-01 TO HOME OR SELFCARE


Is pt being admited?: No


Does the pt Need Aspirin: No (88)


Condition: Stable


Instructions:  Facet Syndrome, Neck Exercises, Radicular Pain, Pain Without a 

Known Cause


Referrals: 


[facility], MD [Primary Care Provider] - 3-5 Days